# Patient Record
Sex: FEMALE | Race: BLACK OR AFRICAN AMERICAN | Employment: FULL TIME | URBAN - METROPOLITAN AREA
[De-identification: names, ages, dates, MRNs, and addresses within clinical notes are randomized per-mention and may not be internally consistent; named-entity substitution may affect disease eponyms.]

---

## 2018-07-16 ENCOUNTER — HOSPITAL ENCOUNTER (OUTPATIENT)
Dept: LAB | Age: 43
Discharge: HOME | End: 2018-07-16

## 2018-07-16 LAB — SENTARA SPECIMEN COL,SENBCF: NORMAL

## 2018-07-16 PROCEDURE — 99001 SPECIMEN HANDLING PT-LAB: CPT | Performed by: NURSE PRACTITIONER

## 2020-01-13 ENCOUNTER — HOSPITAL ENCOUNTER (OUTPATIENT)
Dept: PHYSICAL THERAPY | Age: 45
Discharge: HOME | End: 2020-01-13
Payer: MEDICAID

## 2020-01-13 PROCEDURE — 97162 PT EVAL MOD COMPLEX 30 MIN: CPT | Performed by: PHYSICAL THERAPIST

## 2020-01-13 PROCEDURE — 97535 SELF CARE MNGMENT TRAINING: CPT | Performed by: PHYSICAL THERAPIST

## 2020-01-13 NOTE — PROGRESS NOTES
.[facility] PHYSICAL THERAPY  [address] [place] [address], [address],[facility], [facility] - Phone: [phone]  Fax: [phone] / 7890 [facility]  Patient Name: Jony Ortega : 1975   Medical   Diagnosis: S/p peroneal tendon exolysis Treatment Diagnosis: Right foot pain [M79.671]   Onset Date: 19     Referral Source: Brannon Deng DPM Start of Care [facility]): 2020   Prior Hospitalization: See medical history Provider #: [id number]   Prior Level of Function: Pain with all wb   Comorbidities: OSTEOTOMY, CALCANEUS, REPAIR, RUPTURE, TENDON, ACHILLES, PRIMARY, OPEN , CORRECTION, HALLUX VALGUS, WITH METATARSAL TO CUNEIFORM FUSION, OSTEOTOMY 3/16/19, ventral hernia repair 19     Medications: Verified on Patient Summary List   The Plan of Care and following information is based on the information from the initial evaluation.   ===========================================================================================  Assessment / key information:  40 F arrives to clinic s/p multiple foot surgeries in 2019 due to chronic pain. Per pt following first surgery received 3 weeks of therapy was d/c due to pain levels along medial aspect of foot/ankle. Per pt maintained a nwb and use of rollator until next surgery in 19. She had a ventral hernia repair on 19 which limited her PT attendance this is her first physical therapy appointment since 2019. Rates pain 8-10/10 increasing with any wb. Objective findings: ankle df knee ext -13, knee flex 8, pf 24, ev neutral P!, iv 19, strength reduced 3-/5, figure 8 measurements +1\" with most pain reported along lateral malleoli, unable to perform any heel strike or push off,  SLS unable without immediate lob, negotiates stairs with lateral step to gait pattern. Pt attempting to ambulate community distances with boot and crutches but significant safety concerns. Will attempt PT in order to address problem list below however due to chronic nature of pain pt has a fair dx  ===========================================================================================  Eval Complexity: History HIGH Complexity :3+ comorbidities / personal factors will impact the outcome/ POC ;  Examination  HIGH Complexity : 4+ Standardized tests and measures addressing body structure, function, activity limitation and / or participation in recreation ; Presentation HIGH Complexity : Unstable and unpredictable characteristics ; Decision Making MEDIUM Complexity : FOTO score of 26-74; Overall Complexity MEDIUM  Problem List: pain affecting function, decrease ROM, decrease strength, impaired gait/ balance, decrease ADL/ functional abilitiies, decrease activity tolerance, decrease flexibility/ joint mobility and decrease transfer abilities   Treatment Plan may include any combination of the following: Therapeutic exercise, Therapeutic activities, Neuromuscular re-education, Physical agent/modality, Gait/balance training, Manual therapy, Patient education, Self Care training, Functional mobility training, Home safety training and Stair training  Patient / Family readiness to learn indicated by: asking questions, trying to perform skills and interest  Persons(s) to be included in education: patient (P)  Barriers to Learning/Limitations: yes;  physical  Measures taken, if barriers to learnin:1 session with therapist   Patient Goal (s): Reduce pain, return to normal walking   Patient self reported health status: excellent  Rehabilitation Potential: fair   Short Term Goals: To be accomplished in  2  weeks:  1. Pt will be compliant with hep  2. Pt will increase ankle df to neutral to A with stance phase of gait  3. Pt will note daily max pain </=8/10 in order to increase participation in [address]: To be accomplished in  4  weeks:  1.  Pt will decrease daily max pain </=6/10 in order to increase participation  2. Pt will note increase FOTO 15 points in order to increase participation in adls  3. Pt will increase ankle ev/df arom wnl to A with ambulation   Frequency / Duration:   Patient to be seen  2-3  times per week for 4  weeks:  Patient / Caregiver education and instruction: self care, activity modification, brace/ splint application and exercises  Therapist Signature: Alexander Trimble DPT, MTC Date: 0/97/9954   Certification Period: na Time: 4:50 PM   ===========================================================================================  I certify that the above Physical Therapy Services are being furnished while the patient is under my care. I agree with the treatment plan and certify that this therapy is necessary. Physician Signature:        Date:       Time:     Please sign and return to [facility] at [facility] [place], [facility]. or you may fax the signed copy to [phone]. Thank you.

## 2020-01-13 NOTE — PROGRESS NOTES
[facility] PHYSICAL [facility] - DAILY TREATMENT NOTE    Patient Name: Constance Morris        Date: 2020  : 1975   yes Patient  Verified  Visit #:     Insurance: Payor: Jose Miguel Roberts / Plan: [place] [employer] / Product Type: Managed Care Medicaid /      In time: 400 Out time: 415   Total Treatment Time: 45     Medicare/BCBS Time Tracking (below)   Total Timed Codes (min):  na 1:1 Treatment Time:  na     TREATMENT AREA =  Right foot pain [M79.671]    SUBJECTIVE  Pain Level (on 0 to 10 scale):  8  / 10   Medication Changes/New allergies or changes in medical history, any new surgeries or procedures?    no  If yes, update Summary List   Subjective Functional Status/Changes:  []  No changes reported     See ie          OBJECTIVE    15 min Self Care: Activity and footwear modification, hep   Rationale:    increase ROM, improve balance and increase proprioception to improve the patients ability to complete adls    Billed With/As:   [] TE   [] TA   [] Neuro   [] Self Care Patient Education: [x] Review HEP    [] Progressed/Changed HEP based on:   [] positioning   [] body mechanics   [] transfers   [] heat/ice application    [] other:      Other Objective/Functional Measures:    See ie     Post Treatment Pain Level (on 0 to 10) scale:    10     ASSESSMENT  Assessment/Changes in Function:     See ie     []  See Progress Note/Recertification   Patient will continue to benefit from skilled PT services to modify and progress therapeutic interventions, address functional mobility deficits, address ROM deficits, address strength deficits, analyze and address soft tissue restrictions, analyze and cue movement patterns, analyze and modify body mechanics/ergonomics, assess and modify postural abnormalities, address imbalance/dizziness and instruct in home and community integration to attain remaining goals.    Progress toward goals / Updated goals:    See ie     PLAN  []  Upgrade activities as tolerated yes Continue plan of care   []  Discharge due to :    []  Other:      Therapist: Kajal Kelly PT    Date: 1/13/2020 Time: 4:49 PM     Future Appointments   Date Time Provider Gal Rey   1/15/2020  4:30 PM Gianna Amos [facility]   1/21/2020  3:30 PM Cheri Campbell, PT [facility]

## 2020-01-15 ENCOUNTER — HOSPITAL ENCOUNTER (OUTPATIENT)
Dept: PHYSICAL THERAPY | Age: 45
End: 2020-01-15
Payer: MEDICAID

## 2020-01-21 ENCOUNTER — HOSPITAL ENCOUNTER (OUTPATIENT)
Dept: PHYSICAL THERAPY | Age: 45
Discharge: HOME | End: 2020-01-21
Payer: MEDICAID

## 2020-01-21 PROCEDURE — 97140 MANUAL THERAPY 1/> REGIONS: CPT | Performed by: PHYSICAL THERAPIST

## 2020-01-21 PROCEDURE — 97110 THERAPEUTIC EXERCISES: CPT | Performed by: PHYSICAL THERAPIST

## 2020-01-21 NOTE — PROGRESS NOTES
[facility]   PHYSICAL THERAPY - DAILY TREATMENT NOTE    Patient Name: Fran Valderrama        Date: 2020  : 1975   yes Patient  Verified  Visit #:     Insurance: Payor: Kaleigh Ennis / Plan: [place] [employer] / Product Type: Managed Care Medicaid /      In time: 325 Out time: 420   Total Treatment Time: 55     Medicare/BCBS Time Tracking (below)   Total Timed Codes (min):  na 1:1 Treatment Time:  na     TREATMENT AREA =  Right foot pain [M79.671]    SUBJECTIVE  Pain Level (on 0 to 10 scale):  8  / 10   Medication Changes/New allergies or changes in medical history, any new surgeries or procedures?    no  If yes, update Summary List   Subjective Functional Status/Changes:  []  No changes reported   I have just been hurting unless I put my feet up the swelling is so bad         OBJECTIVE  Modalities Rationale:     decrease inflammation, decrease pain and increase tissue extensibility to improve patient's ability to complete adls    min [] Estim, type/location:                                      []  att     []  unatt     []  w/US     []  w/ice    []  w/heat    min []  Mechanical Traction: type/lbs                   []  pro   []  sup   []  int   []  cont    []  before manual    []  after manual    min []  Ultrasound, settings/location:      min []  Iontophoresis w/ dexamethasone, location:                                               []  take home patch       []  in clinic   10 min [x]  Ice     []  Heat    location/position: Supine with bolster     min []  Vasopneumatic Device, press/temp:     min []  Other:    [x] Skin assessment post-treatment (if applicable):    []  intact    []  redness- no adverse reaction     []redness  adverse reaction:        20 min Therapeutic Exercise:  [x]  See flow sheet   Rationale:      increase ROM, increase strength and increase proprioception to improve the patients ability to wb     25 min Manual Therapy: Hallux prom, ir at tmt, cfm incision, mfr lateral ankle, stm and stretch gastroc soleus   Rationale:      decrease pain, increase ROM, increase tissue extensibility and decrease trigger points to improve patient's ability to wb        Billed With/As:   [] TE   [] TA   [] Neuro   [] Self Care Patient Education: [x] Review HEP    [] Progressed/Changed HEP based on:   [] positioning   [] body mechanics   [] transfers   [] heat/ice application    [] other:      Other Objective/Functional Measures:    Increased time required to complete te due to significant loss of motion and proprioception with hhcs throughout  Significant tenderness along soleus and lateral compartment     Post Treatment Pain Level (on 0 to 10) scale:   6  / 10     ASSESSMENT  Assessment/Changes in Function:     Slight reduction in pain levels following initial session - pt was advised on doms     []  See Progress Note/Recertification   Patient will continue to benefit from skilled PT services to modify and progress therapeutic interventions, address functional mobility deficits, address ROM deficits, address strength deficits, analyze and address soft tissue restrictions, analyze and cue movement patterns, analyze and modify body mechanics/ergonomics, assess and modify postural abnormalities, address imbalance/dizziness and instruct in home and community integration to attain remaining goals.    Progress toward goals / Updated goals:    No significant changes towards goals in one treatment session      PLAN  []  Upgrade activities as tolerated yes Continue plan of care   []  Discharge due to :    []  Other:      Therapist: Nicolasa Ott PT    Date: 1/21/2020 Time: 2:29 PM     Future Appointments   Date Time Provider Gal Rey   1/21/2020  3:30 PM Pat List [facility]   1/24/2020  1:15 PM Cecy Pedro, PT [facility]

## 2020-01-24 ENCOUNTER — HOSPITAL ENCOUNTER (OUTPATIENT)
Dept: PHYSICAL THERAPY | Age: 45
End: 2020-01-24
Payer: MEDICAID

## 2020-02-10 ENCOUNTER — HOSPITAL ENCOUNTER (OUTPATIENT)
Dept: PHYSICAL THERAPY | Age: 45
End: 2020-02-10

## 2020-02-11 ENCOUNTER — APPOINTMENT (OUTPATIENT)
Dept: PHYSICAL THERAPY | Age: 45
End: 2020-02-11

## 2020-02-12 ENCOUNTER — APPOINTMENT (OUTPATIENT)
Dept: PHYSICAL THERAPY | Age: 45
End: 2020-02-12

## 2020-02-12 NOTE — PROGRESS NOTES
.    [facility] PHYSICAL THERAPY  [address] [place] [address],[facility], 70 [facility] - Phone: [phone]  Fax: [phone]    DISCHARGE NOTE  Patient Name: Azalea Miller : 1975   Treatment/Medical Diagnosis: Right foot pain [M79.671]   Referral Source: Will Vera DPM     Date of Initial Visit: 20 Attended Visits: 2 Missed Visits: 4       SUMMARY OF TREATMENT  Pt attended only initial evaluation and     1     follow-ups and then did not return. Therefore a formal reassessment of goals was not performed. RECOMMENDATIONS  Discontinue physical therapy due to patient not returning. If you have any questions/comments please contact us directly at [phone]. Thank you for allowing us to assist in the care of your patient.     Therapist Signature: Max Knight, PT Date: 20     Time: 1:13 PM